# Patient Record
Sex: FEMALE | Race: WHITE | Employment: FULL TIME | URBAN - METROPOLITAN AREA
[De-identification: names, ages, dates, MRNs, and addresses within clinical notes are randomized per-mention and may not be internally consistent; named-entity substitution may affect disease eponyms.]

---

## 2024-07-24 ENCOUNTER — OFFICE VISIT (OUTPATIENT)
Age: 34
End: 2024-07-24

## 2024-07-24 VITALS
HEIGHT: 59 IN | TEMPERATURE: 98.7 F | HEART RATE: 86 BPM | WEIGHT: 144 LBS | OXYGEN SATURATION: 98 % | BODY MASS INDEX: 29.03 KG/M2 | SYSTOLIC BLOOD PRESSURE: 114 MMHG | RESPIRATION RATE: 20 BRPM | DIASTOLIC BLOOD PRESSURE: 80 MMHG

## 2024-07-24 DIAGNOSIS — R06.02 SOB (SHORTNESS OF BREATH): ICD-10-CM

## 2024-07-24 DIAGNOSIS — U07.1 COVID: Primary | ICD-10-CM

## 2024-07-24 DIAGNOSIS — R06.02 SOB (SHORTNESS OF BREATH): Primary | ICD-10-CM

## 2024-07-24 LAB
INFLUENZA A ANTIGEN, POC: NEGATIVE
INFLUENZA B ANTIGEN, POC: NEGATIVE
Lab: NORMAL
PERFORMING INSTRUMENT: NORMAL
QC PASS/FAIL: NORMAL
SARS-COV-2, POC: NORMAL
STREP PYOGENES DNA, POC: NEGATIVE
VALID INTERNAL CONTROL, POC: YES
VALID INTERNAL CONTROL, POC: YES

## 2024-07-24 RX ORDER — IBUPROFEN 200 MG
200 TABLET ORAL
COMMUNITY
Start: 2013-08-07

## 2024-07-24 RX ORDER — IPRATROPIUM BROMIDE AND ALBUTEROL SULFATE 2.5; .5 MG/3ML; MG/3ML
1 SOLUTION RESPIRATORY (INHALATION) ONCE
Status: COMPLETED | OUTPATIENT
Start: 2024-07-24 | End: 2024-07-24

## 2024-07-24 RX ORDER — NORETHINDRONE 0.35 MG/1
1 TABLET ORAL DAILY
COMMUNITY
Start: 2024-07-23

## 2024-07-24 RX ORDER — ALBUTEROL SULFATE 90 UG/1
2 AEROSOL, METERED RESPIRATORY (INHALATION) EVERY 6 HOURS PRN
Qty: 6.7 G | Refills: 0 | Status: SHIPPED | OUTPATIENT
Start: 2024-07-24 | End: 2024-08-03

## 2024-07-24 RX ORDER — RIZATRIPTAN BENZOATE 10 MG/1
10 TABLET ORAL
COMMUNITY
Start: 2013-08-07

## 2024-07-24 RX ADMIN — IPRATROPIUM BROMIDE AND ALBUTEROL SULFATE 1 DOSE: 2.5; .5 SOLUTION RESPIRATORY (INHALATION) at 14:17

## 2024-07-24 NOTE — PROGRESS NOTES
Екатерина Abel (:  1990) is a 33 y.o. female,New patient, here for evaluation of the following chief complaint(s):  Positive For Covid-19 (Pt c/o positive for covid on . Sxs started on Friday positive on , yesterday first day without fever, sob started yesterday was having a hard time getting here. When moving around gets very winded and nauseous )       ASSESSMENT/PLAN:  1. COVID  -     ipratropium 0.5 mg-albuterol 2.5 mg (DUONEB) nebulizer solution 1 Dose; 1 Dose, Inhalation, ONCE, 1 dose, On 24 at 1430Initiate RT Bronchodilator Protocol: Yes - Inpatient Protocol  -     albuterol sulfate HFA (PROVENTIL;VENTOLIN;PROAIR) 108 (90 Base) MCG/ACT inhaler; Inhale 2 puffs into the lungs every 6 hours as needed for Wheezing, Disp-6.7 g, R-0Normal  -     AMB POC STREP GO A DIRECT, DNA PROBE  2. SOB (shortness of breath)  -     ipratropium 0.5 mg-albuterol 2.5 mg (DUONEB) nebulizer solution 1 Dose; 1 Dose, Inhalation, ONCE, 1 dose, On 24 at 1430Initiate RT Bronchodilator Protocol: Yes - Inpatient Protocol  -     POCT COVID-19, Antigen  -     albuterol sulfate HFA (PROVENTIL;VENTOLIN;PROAIR) 108 (90 Base) MCG/ACT inhaler; Inhale 2 puffs into the lungs every 6 hours as needed for Wheezing, Disp-6.7 g, R-0Normal  -     AMB POC INFLUENZA A  AND B REAL-TIME RT-PCR  -     XR CHEST PA/LAT; Future        Advised to continue with tylenol for fever and body aches, may alternate with ibuprofen. Advised to continue to isolate for 5 days from onset of symptoms and until fever free for 24 hours. Patient should continue to wear a mask for an additional 5 days, total of 10 days from symptom onset.  Patient should drink plenty of fluids, to include some with electrolytes. Patient reassured that O2 saturation was stable at 98%      Follow up if symptoms persist or if symptoms worsen please present to the ED.    Indications for hospitalization - There are no fixed criteria for inpatient

## 2024-07-25 ENCOUNTER — TELEPHONE (OUTPATIENT)
Age: 34
End: 2024-07-25

## 2024-07-25 NOTE — TELEPHONE ENCOUNTER
Patient called requesting X-ray CD and findings for . Informed patient we could have it ready within the hour, and she stated she would come by to pick them up tomorrow before her PCP appointment.